# Patient Record
Sex: MALE
[De-identification: names, ages, dates, MRNs, and addresses within clinical notes are randomized per-mention and may not be internally consistent; named-entity substitution may affect disease eponyms.]

---

## 2019-02-08 ENCOUNTER — HOSPITAL ENCOUNTER (EMERGENCY)
Dept: HOSPITAL 94 - ER | Age: 29
Discharge: LEFT BEFORE DISCHARGE | End: 2019-02-08
Payer: MEDICAID

## 2019-02-08 DIAGNOSIS — Z00.8: Primary | ICD-10-CM

## 2019-02-08 DIAGNOSIS — Z53.21: ICD-10-CM

## 2020-05-12 ENCOUNTER — HOSPITAL ENCOUNTER (EMERGENCY)
Dept: HOSPITAL 94 - ER | Age: 30
Discharge: HOME | End: 2020-05-12
Payer: MEDICARE

## 2020-05-12 VITALS — WEIGHT: 174.19 LBS | HEIGHT: 71 IN | BODY MASS INDEX: 24.39 KG/M2

## 2020-05-12 VITALS — DIASTOLIC BLOOD PRESSURE: 90 MMHG | SYSTOLIC BLOOD PRESSURE: 141 MMHG
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
## 2020-05-12 DIAGNOSIS — F32.9: ICD-10-CM

## 2020-05-12 DIAGNOSIS — F12.90: ICD-10-CM
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
## 2020-05-12 DIAGNOSIS — F10.929: ICD-10-CM

## 2020-05-12 DIAGNOSIS — S50.811A: Primary | ICD-10-CM

## 2020-05-12 DIAGNOSIS — R45.851: ICD-10-CM
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
## 2020-05-12 DIAGNOSIS — E87.6: ICD-10-CM
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
## 2020-05-12 LAB
ALBUMIN SERPL BCP-MCNC: 4.4 G/DL (ref 3.4–5)
ALBUMIN/GLOB SERPL: 1.3 {RATIO} (ref 1.1–1.5)
ALP SERPL-CCNC: 72 IU/L (ref 46–116)
ALT SERPL W P-5'-P-CCNC: 26 U/L (ref 12–78)
AMPHETAMINES UR QL SCN: NEGATIVE
ANION GAP SERPL CALCULATED.3IONS-SCNC: 12 MMOL/L (ref 8–16)
AST SERPL W P-5'-P-CCNC: 18 U/L (ref 10–37)
BARBITURATES UR QL SCN: NEGATIVE
BASOPHILS # BLD AUTO: 0 X10'3 (ref 0–0.2)
BASOPHILS NFR BLD AUTO: 0.6 % (ref 0–1)
BENZODIAZ UR QL SCN: NEGATIVE
BILIRUB SERPL-MCNC: 0.8 MG/DL (ref 0.1–1)
BUN SERPL-MCNC: 7 MG/DL (ref 7–18)
BUN/CREAT SERPL: 6.5 (ref 5.4–32)
BZE UR QL SCN: NEGATIVE
CALCIUM SERPL-MCNC: 8.4 MG/DL (ref 8.5–10.1)
CANNABINOIDS UR QL SCN: NEGATIVE
CHLORIDE SERPL-SCNC: 105 MMOL/L (ref 99–107)
CO2 SERPL-SCNC: 27 MMOL/L (ref 24–32)
CREAT SERPL-MCNC: 1.07 MG/DL (ref 0.6–1.1)
EOSINOPHIL # BLD AUTO: 0.1 X10'3 (ref 0–0.9)
EOSINOPHIL NFR BLD AUTO: 1 % (ref 0–6)
ERYTHROCYTE [DISTWIDTH] IN BLOOD BY AUTOMATED COUNT: 13 % (ref 11.5–14.5)
ETHANOL SERPL-MCNC: 0.09 GM/DL (ref 0–0.01)
GFR SERPL CREATININE-BSD FRML MDRD: 82 ML/MIN
GLUCOSE SERPL-MCNC: 123 MG/DL (ref 70–104)
HCT VFR BLD AUTO: 44.6 % (ref 42–52)
HGB BLD-MCNC: 15.6 G/DL (ref 14–17.9)
LYMPHOCYTES # BLD AUTO: 2.6 X10'3 (ref 1.1–4.8)
LYMPHOCYTES NFR BLD AUTO: 35.4 % (ref 21–51)
MCH RBC QN AUTO: 32 PG (ref 27–31)
MCHC RBC AUTO-ENTMCNC: 35 G/DL (ref 33–36.5)
MCV RBC AUTO: 91.3 FL (ref 78–98)
METHADONE UR QL SCN: NEGATIVE
MONOCYTES # BLD AUTO: 0.5 X10'3 (ref 0–0.9)
MONOCYTES NFR BLD AUTO: 6.4 % (ref 2–12)
NEUTROPHILS # BLD AUTO: 4.1 X10'3 (ref 1.8–7.7)
NEUTROPHILS NFR BLD AUTO: 56.6 % (ref 42–75)
OPIATES UR QL SCN: NEGATIVE
PCP UR QL SCN: NEGATIVE
PLATELET # BLD AUTO: 265 X10'3 (ref 140–440)
PMV BLD AUTO: 7.5 FL (ref 7.4–10.4)
POTASSIUM SERPL-SCNC: 3.1 MMOL/L (ref 3.5–5.1)
PROT SERPL-MCNC: 7.7 G/DL (ref 6.4–8.2)
RBC # BLD AUTO: 4.89 X10'6 (ref 4.7–6.1)
SODIUM SERPL-SCNC: 144 MMOL/L (ref 135–145)
WBC # BLD AUTO: 7.2 X10'3 (ref 4.5–11)

## 2020-05-12 PROCEDURE — 80305 DRUG TEST PRSMV DIR OPT OBS: CPT

## 2020-05-12 PROCEDURE — 85025 COMPLETE CBC W/AUTO DIFF WBC: CPT

## 2020-05-12 PROCEDURE — 80320 DRUG SCREEN QUANTALCOHOLS: CPT

## 2020-05-12 PROCEDURE — 36415 COLL VENOUS BLD VENIPUNCTURE: CPT

## 2020-05-12 PROCEDURE — 99284 EMERGENCY DEPT VISIT MOD MDM: CPT

## 2020-05-12 PROCEDURE — 80053 COMPREHEN METABOLIC PANEL: CPT

## 2020-05-12 NOTE — NUR
[place] from [facility] at bedside to assess the patient at this time.

## 2020-07-19 ENCOUNTER — HOSPITAL ENCOUNTER (EMERGENCY)
Dept: HOSPITAL 94 - ER | Age: 30
Discharge: HOME | End: 2020-07-19
Payer: MEDICARE

## 2020-07-19 VITALS — DIASTOLIC BLOOD PRESSURE: 74 MMHG | SYSTOLIC BLOOD PRESSURE: 118 MMHG

## 2020-07-19 VITALS — HEIGHT: 71 IN | BODY MASS INDEX: 19.92 KG/M2 | WEIGHT: 142.31 LBS

## 2020-07-19 DIAGNOSIS — Z72.89: ICD-10-CM

## 2020-07-19 DIAGNOSIS — Z79.899: ICD-10-CM

## 2020-07-19 DIAGNOSIS — K59.00: Primary | ICD-10-CM

## 2020-07-19 DIAGNOSIS — F12.90: ICD-10-CM

## 2020-07-19 DIAGNOSIS — E86.0: ICD-10-CM

## 2020-07-19 LAB
ALBUMIN SERPL BCP-MCNC: 4.9 G/DL (ref 3.4–5)
ALBUMIN/GLOB SERPL: 1.3 {RATIO} (ref 1.1–1.5)
ALP SERPL-CCNC: 47 IU/L (ref 46–116)
ALT SERPL W P-5'-P-CCNC: 22 U/L (ref 12–78)
AMPHETAMINES UR QL SCN: NEGATIVE
ANION GAP SERPL CALCULATED.3IONS-SCNC: 17 MMOL/L (ref 8–16)
AST SERPL W P-5'-P-CCNC: 24 U/L (ref 10–37)
BACTERIA URNS QL MICRO: (no result) /HPF
BARBITURATES UR QL SCN: NEGATIVE
BASOPHILS # BLD AUTO: 0 X10'3 (ref 0–0.2)
BASOPHILS NFR BLD AUTO: 0.6 % (ref 0–1)
BENZODIAZ UR QL SCN: NEGATIVE
BILIRUB SERPL-MCNC: 1.2 MG/DL (ref 0.1–1)
BUN SERPL-MCNC: 30 MG/DL (ref 7–18)
BUN/CREAT SERPL: 22.4 (ref 5.4–32)
BZE UR QL SCN: NEGATIVE
CALCIUM SERPL-MCNC: 10.1 MG/DL (ref 8.5–10.1)
CANNABINOIDS UR QL SCN: NEGATIVE
CHLORIDE SERPL-SCNC: 110 MMOL/L (ref 99–107)
CLARITY UR: CLEAR
CO2 SERPL-SCNC: 23.5 MMOL/L (ref 24–32)
COLOR UR: YELLOW
CREAT SERPL-MCNC: 1.34 MG/DL (ref 0.6–1.1)
DEPRECATED SQUAMOUS URNS QL MICRO: (no result) /LPF
EOSINOPHIL # BLD AUTO: 0 X10'3 (ref 0–0.9)
EOSINOPHIL NFR BLD AUTO: 0.2 % (ref 0–6)
ERYTHROCYTE [DISTWIDTH] IN BLOOD BY AUTOMATED COUNT: 12.9 % (ref 11.5–14.5)
GFR SERPL CREATININE-BSD FRML MDRD: 63 ML/MIN
GLUCOSE SERPL-MCNC: 90 MG/DL (ref 70–104)
GLUCOSE UR STRIP-MCNC: NEGATIVE MG/DL
HCT VFR BLD AUTO: 47 % (ref 42–52)
HGB BLD-MCNC: 15.9 G/DL (ref 14–17.9)
HGB UR QL STRIP: NEGATIVE
KETONES UR STRIP-MCNC: >=80 MG/DL
LEUKOCYTE ESTERASE UR QL STRIP: NEGATIVE
LIPASE SERPL-CCNC: 120 U/L (ref 73–393)
LYMPHOCYTES # BLD AUTO: 1.3 X10'3 (ref 1.1–4.8)
LYMPHOCYTES NFR BLD AUTO: 19.7 % (ref 21–51)
MCH RBC QN AUTO: 31.6 PG (ref 27–31)
MCHC RBC AUTO-ENTMCNC: 33.9 G/DL (ref 33–36.5)
MCV RBC AUTO: 93.1 FL (ref 78–98)
METHADONE UR QL SCN: NEGATIVE
MONOCYTES # BLD AUTO: 0.4 X10'3 (ref 0–0.9)
MONOCYTES NFR BLD AUTO: 6.8 % (ref 2–12)
MUCOUS THREADS URNS QL MICRO: (no result) /LPF
NEUTROPHILS # BLD AUTO: 4.7 X10'3 (ref 1.8–7.7)
NEUTROPHILS NFR BLD AUTO: 72.7 % (ref 42–75)
NITRITE UR QL STRIP: NEGATIVE
OPIATES UR QL SCN: NEGATIVE
PCP UR QL SCN: NEGATIVE
PH UR STRIP: 5.5 [PH] (ref 4.8–8)
PLATELET # BLD AUTO: 258 X10'3 (ref 140–440)
PMV BLD AUTO: 9 FL (ref 7.4–10.4)
POTASSIUM SERPL-SCNC: 4 MMOL/L (ref 3.5–5.1)
PROT SERPL-MCNC: 8.6 G/DL (ref 6.4–8.2)
PROT UR QL STRIP: 30 MG/DL
RBC # BLD AUTO: 5.04 X10'6 (ref 4.7–6.1)
RBC #/AREA URNS HPF: (no result) /HPF (ref 0–2)
SODIUM SERPL-SCNC: 150 MMOL/L (ref 135–145)
SP GR UR STRIP: >=1.03 (ref 1–1.03)
URN COLLECT METHOD CLASS: (no result)
UROBILINOGEN UR STRIP-MCNC: 1 E.U/DL (ref 0.2–1)
WBC # BLD AUTO: 6.5 X10'3 (ref 4.5–11)
WBC #/AREA URNS HPF: (no result) /HPF (ref 0–4)

## 2020-07-19 PROCEDURE — 99284 EMERGENCY DEPT VISIT MOD MDM: CPT

## 2020-07-19 PROCEDURE — 80305 DRUG TEST PRSMV DIR OPT OBS: CPT

## 2020-07-19 PROCEDURE — 83690 ASSAY OF LIPASE: CPT

## 2020-07-19 PROCEDURE — 80053 COMPREHEN METABOLIC PANEL: CPT

## 2020-07-19 PROCEDURE — 36415 COLL VENOUS BLD VENIPUNCTURE: CPT

## 2020-07-19 PROCEDURE — 85025 COMPLETE CBC W/AUTO DIFF WBC: CPT

## 2020-07-19 PROCEDURE — 81001 URINALYSIS AUTO W/SCOPE: CPT

## 2020-07-19 PROCEDURE — 96360 HYDRATION IV INFUSION INIT: CPT

## 2020-07-19 PROCEDURE — 74018 RADEX ABDOMEN 1 VIEW: CPT

## 2022-12-03 NOTE — NUR
Admission Note: Pt. was admitted from [facility] in a w/c, safety check was completed and 
belongings were inventoried by Pam Castaneda. Pt. is on a 5150 for DTS. Per 5150: Pt. made 
suicidal statements, and reported placing a knife to his neck and starting to stab himself 
in a suicide attempt. Pt. has a history of 3-5 past suicide attempts, the most recent was 
one month ago. 



Pt. scored as a high risk on the [employer] Suicide Risk assessment, however he is able to 
contract for safety while on the unit. This was endorsed to SAVANNAH Schultz and Q 15min safety 
checks were ordered.

## 2022-12-04 NOTE — NUR
Nursing Progress Note:



Problem: Pt. was admitted from [facility] Pt. is on a 5150 for DTS. Per 5150: Pt. made suicidal 
statements, and reported placing a knife to his neck and starting to stab himself in a 
suicide attempt. Pt. has a history of 3-5 past suicide attempts, the most recent was one 
month ago. Per ER notes pt has Differential Dx of psychosis, depression, suicidal ideation. 



Interventions: Maintained a safe and supportive environment, administered medication per 
orders with no adverse side effects, provided clear and simple instructions, provided 
redirection as needed, and maintained Q 15min safety checks.



Response:  Pt was resting in bed at start of shift. 1:1 assessment and interview at bedside, 
pt unable to maintain eye contact. Pt states he is having SI because he is depressed about 
life. Pt also states he has AH/VH. When asked of content pt states I dont remember what 
they say. Pt states he sees images of people. Pt did not eat dinner and refused snack. Pt 
states he is not hungry. Pt isolates in room. Pt slept through the night.



Plan: Crisis interruption, stabilization with medication adjustment, management and 
monitoring in a safe and therapeutic environment until stable.

## 2022-12-04 NOTE — NUR
Nursing Progress Note:



Problem: Pt. was admitted from [facility] Pt. is on a 5150 for DTS. Per 5150: Pt. made suicidal 
statements, and reported placing a knife to his neck and starting to stab himself in a 
suicide attempt. Pt. has a history of 3-5 past suicide attempts, the most recent was one 
month ago. Patient was discharged from [facility] on 11/29/22.  Per ER notes pt has Differential Dx 
of psychosis, depression, suicidal ideation. 



Interventions: Provide medication administration & medication management; Maintained a safe 
& supportive environment; Clear & simple instructions; Direction & encouragement  regarding 
performance of ADLs; monitored behaviors & maintained clear boundaries; Patient physical 
assessment & 1:1 patient interview; Therapeutic conversation & active listening; Patient 
education & monitoring.



Response:  Received patient while he was asleep in his bed and woke up at approximately 
0740.  Patient appears to have a flat affect.  Speaks only to questions asked by this 
Writer.  Patient went to the [facility] for breakfast, but only ate a minimal amount of 
breakfast.  Patient returned to his room for a patient interview.  Patient reports Im very 
suicidal and I will kill myself with the knife that I have.  Informed the patient that we 
will try to help him with his medication regime and also attending group sessions starting 
tomorrow.  Patient gave this Writer a small grin and informed Okay.  Patient slept all 
morning and got up for lunch then returned back to bed. Patient self-isolated all day.   



Plan: Crisis interruption, stabilization with medication adjustment, management and 
monitoring in a safe and therapeutic environment until stable.

## 2022-12-04 NOTE — NUR
Noted pt low BMI 18.0 in EMR. Pt admit from [facility] DX SI, 5150, depression, 

and having AH/VH per EMR. Pt was malnourished on recent prior 11/8/22 

admit though by discharge 11/28 was eating adequately from meals and 

Ensure Enlives TID. Current standing scaled wt 55.2kg down from 57.7 11/28 

though pt w/ normal strength, no edema/wounds, and prior adequate intake 

hx. Given recent adequate intake hx pt lacks malnutrition criteria at this 

time though is high risk if PO regresses this admit. Will monitor for PO 

trends and malnutrition criteria this admit.

-------------------------------------------------------------------------------

Addendum: 12/04/22 at 0749 by Eric Sanchez RD

-------------------------------------------------------------------------------

Amended: Links added.

## 2022-12-05 NOTE — NUR
Nursing Progress Note:



Problem: Pt. was admitted from [facility] Pt. is on a 5150 for DTS. Per 5150: Pt. made suicidal 
statements, and reported placing a knife to his neck and starting to stab himself in a 
suicide attempt. Pt. has a history of 3-5 past suicide attempts, the most recent was one 
month ago. Patient was discharged from [facility] on 11/29/22.  Per ER notes pt has Differential Dx 
of psychosis, depression, suicidal ideation. 



Interventions: Provide medication administration & medication management; Maintained a safe 
& supportive environment; Clear & simple instructions; Direction & encouragement  regarding 
performance of ADLs; monitored behaviors & maintained clear boundaries; Patient physical 
assessment & 1:1 patient interview; Therapeutic conversation & active listening; Patient 
education & monitoring.



Response: Received patient when he woke up from his night sleep at 0720.  During the patient 
interview, the patient denied anxiety, and reported When I get out of here I am going to 
sharpen my knife first then use it on my neck.  Patient was observed staring eyes wide open 
multiple times while sitting alongside his bed throughout the day, and he appeared restless, 
although denying any s/s of anxiety throughout the morning.  At approximately 1230 patient 
was observed ambulating throughout the halls, staring blankly straight ahead without making 
any eye contact with others, and refused his lunch.  TC placed to SAVANNAH Queen, to inform of 
patients anxiety and need for a prn medication to treat his anxiety.  Dr. Chang was readily 
available and ordered Ativan 0.5mg po qid prn.  Patient received his first dose at 1350 and 
laid down to rest at this time.  





Plan: Crisis interruption, stabilization with medication adjustment, management and 
monitoring in a safe and therapeutic environment until stable. Patient has been referred to 
[facility].

## 2022-12-05 NOTE — NUR
Nursing Progress Note:



Problem: Pt. was admitted from [facility] Pt. is on a 5150 for DTS. Per 5150: Pt. made suicidal 
statements, and reported placing a knife to his neck and starting to stab himself in a 
suicide attempt. Pt. has a history of 3-5 past suicide attempts, the most recent was one 
month ago. Patient was discharged from [facility] on 11/29/22.  Per ER notes pt has Differential Dx 
of psychosis, depression, suicidal ideation. 



Interventions: Provide medication administration & medication management; Maintained a safe 
& supportive environment; Clear & simple instructions; Direction & encouragement  regarding 
performance of ADLs; monitored behaviors & maintained clear boundaries; Patient physical 
assessment & 1:1 patient interview; Therapeutic conversation & active listening; Patient 
education & monitoring.



Response:  Pt isolated to room all shift.  



Declined snack.  No HS routine medications.  Declined PRN trazodone.  



Pt has a flat affect minimal answers to questions  in a very soft monotone.  Pt says he has 
suicidal thoughts but would not do anything in here. 



 He says he hears voices but says he does not remember what they say.  Declined snack 
sleeping at this time. 



Plan: Crisis interruption, stabilization with medication adjustment, management and 
monitoring in a safe and therapeutic environment until stable.

## 2022-12-06 NOTE — NUR
Nursing Progress Note: Mayco CAI



Problem: 

Pt. was admitted from [facility] Pt. is on a 5150 for DTS. Per 5150: Pt. made suicidal statements, 
and reported placing a knife to his neck and starting to stab himself in a suicide attempt. 
Pt. has a history of 3-5 past suicide attempts, the most recent was one month ago. Patient 
was discharged from [facility] on 11/29/22.  Dx of psychosis, depression, suicidal ideation. 



Interventions: 

Medication administration, 1:1 MH assessment, maintained a safe and supportive environment,  
provided clear and simple instructions, provided encouragement regarding performance of 
ADLs, monitored behaviors and maintained clear boundaries, maintained Q15 minute safety 
checks.

Response: 

Received pt. sleeping in his room, he woke for medications which he took without hesitation. 
Pt. endorses SI, stating Ill cut myself he denies HI, endorses AH I hear people, but I 
cant hear everything they say he denies VH. Pt. isolated in his room all shift. He briefly 
attended meals but left without eating breakfast and lunch. Pt. was willing to drink an 
Ensure at the bedside with this writer and also had a juice. He has poor eye contact, and 
speaks in a whispered voice, and presents as down casted. Pt. is disheveled, poor hygiene, 
and wears unit scrubs. 



Plan: 

Crisis interruption, stabilization with medication adjustment, management and monitoring in 
a safe and therapeutic environment until stable. Patient has been referred to [facility].

## 2022-12-06 NOTE — NUR
Nursing Progress Note:



Problem: Pt. was admitted from [facility] Pt. is on a 5150 for DTS. Per 5150: Pt. made suicidal 
statements, and reported placing a knife to his neck and starting to stab himself in a 
suicide attempt. Pt. has a history of 3-5 past suicide attempts, the most recent was one 
month ago. Patient was discharged from [facility] on 11/29/22.  Per ER notes pt has Differential Dx 
of psychosis, depression, suicidal ideation. 



Interventions: Provide medication administration & medication management; Maintained a safe 
& supportive environment; Clear & simple instructions; Direction & encouragement  regarding 
performance of ADLs; monitored behaviors & maintained clear boundaries; Patient physical 
assessment & 1:1 patient interview; Therapeutic conversation & active listening; Patient 
education & monitoring.



Response: 

1:1 at bedside. Pt laying in bed. Appears withdrawn and depressed. Denies SI and hearing 
voices at this time. He tells me his depression started at age 3yo. Refused snack. "Not 
hungry". Encouraged him to try and eat. 



His lips appeared dry and chapped. Lip balm provided and pt reported it helping. Compliant 
with all meds.



No HS meds scheduled.



 Pt isolated to room all shift.  



Plan: Crisis interruption, stabilization with medication adjustment, management and 
monitoring in a safe and therapeutic environment until stable.

## 2022-12-06 NOTE — NUR
Nursing Progress Note:



Problem: Pt. was admitted from [facility] Pt. is on a 5150 for DTS. Per 5150: Pt. made suicidal 
statements, and reported placing a knife to his neck and starting to stab himself in a 
suicide attempt. Pt. has a history of 3-5 past suicide attempts, the most recent was one 
month ago. Patient was discharged from [facility] on 11/29/22.  Per ER notes pt has Differential Dx 
of psychosis, depression, suicidal ideation. 



Interventions: Provide medication administration & medication management; Maintained a safe 
& supportive environment; Clear & simple instructions; Direction & encouragement  regarding 
performance of ADLs; monitored behaviors & maintained clear boundaries; Patient physical 
assessment & 1:1 patient interview; Therapeutic conversation & active listening; Patient 
education & monitoring.



Response: 

1:1 at bedside. Pt laying in bed. Appears withdrawn and depressed. Denies SI and hearing 
voices at this time. He tells me his depression started at age 3yo. Refused snack. "Not 
hungry". Encouraged him to try and eat. 



His lips appeared dry and chapped. Lip balm provided and pt reported it helping. Compliant 
with all meds.



No HS meds scheduled.













 

 Pt isolated to room all shift.  



Declined snack.  No HS routine medications.  Declined PRN trazodone.  



Pt has a flat affect minimal answers to questions  in a very soft monotone.  Pt says he has 
suicidal thoughts but would not do anything in here. 



 He says he hears voices but says he does not remember what they say.  Declined snack 
sleeping at this time. 



Plan: Crisis interruption, stabilization with medication adjustment, management and 
monitoring in a safe and therapeutic environment until stable.


-------------------------------------------------------------------------------

Addendum: 12/06/22 at 0131 by Sade Hernández RN

-------------------------------------------------------------------------------

Disregard this note see following note.

## 2022-12-07 NOTE — NUR
Nursing Progress Note: 



Problem: 

Pt. was admitted from [facility] Pt. is on a 5150 for DTS. Per 5150: Pt. made suicidal statements, 
and reported placing a knife to his neck and starting to stab himself in a suicide attempt. 
Pt. has a history of 3-5 past suicide attempts, the most recent was one month ago. Patient 
was discharged from [facility] on 11/29/22.  Dx of psychosis, depression, suicidal ideation. 



Interventions: 

Medication administration, 1:1 MH assessment, maintained a safe and supportive environment,  
provided clear and simple instructions, provided encouragement regarding performance of 
ADLs, monitored behaviors and maintained clear boundaries, maintained Q15 minute safety 
checks.



Response: 

Patient is pleasant but guarded; no scheduled medications this shift and denied needing any 
PRN medication. Patient denied SI, HI, A/VH; patient responded as minimal as possible. He 
remained in bed throughout this shift; does not appear to be having difficulty sleeping. 



Plan: 

Crisis interruption, stabilization with medication adjustment, management and monitoring in 
a safe and therapeutic environment until stable. Patient has been referred to [facility].

## 2022-12-07 NOTE — NUR
Nursing Progress Note: Mayco CAI



Problem: 

Pt. was admitted from [facility] Pt. is on a 5150 for DTS. Per 5150: Pt. made suicidal statements, 
and reported placing a knife to his neck and starting to stab himself in a suicide attempt. 
Pt. has a history of 3-5 past suicide attempts, the most recent was one month ago. Patient 
was discharged from [facility] on 11/29/22.  Dx of psychosis, depression, suicidal ideation. 



Interventions: 

Medication administration, 1:1  assessment, maintained a safe and supportive environment,  
provided clear and simple instructions, provided encouragement regarding performance of 
ADLs, monitored behaviors and maintained clear boundaries, maintained Q15 minute safety 
checks.

Response: 

Received pt. sleeping in his room and awoke to take his medications. Pt. endorses SI, and 
reports he would use a knife he denies HI, AH, VH. Pt. required several prompts to eat his 
meals but often refused to go in dining room, or eat if the tray was brought in. Pt. was 
encouraged to increase bedside fluids, but is not compliant. Pt. presents as disheveled, 
poor hygiene, and wearing the same street clothes from yesterday. Pt. did take a shower 
after several request but did not attend to oral care. Pt. reported normal BM, but later 
reported LBM was 5 days ago. The writer reproached pt. and discussed BM Hx and he now 
reports 3 days since LBM, he presents as a poor historian, but c/o feeling full Bowel 
sounds sluggish; PRN MOM given. Pt. presents withdrawn, minimal communication, and often 
mumbles in a small voice. Pt. approached staff this afternoon c/o anxiety and was given 
PRN Ativan. He remained in his room and isolated from cohorts. 

Pt. refused all meals this shift.

PRNs : Ativan

Plan: 

Crisis interruption, stabilization with medication adjustment, management and monitoring in 
a safe and therapeutic environment until stable. Patient has been referred to [facility].

## 2022-12-07 NOTE — NUR
Initial: Pt admitted w/ major depressive disorder and SI per EMR. 

Currently on Regular diet mostly refusing meals. Per documentation pt 

mostly stays in room all day and says he is not hungry. Per nursing notes 

pt was willing to drink Ensure at bedside though ONS not currently 

ordered. Recommend adding an Ensure Enlive TID if pt is willing to drink 

them. LBM 12/5. Will continue to monitor.

Recs:

1. Continue Regular diet; encourage PO

2. Ensure Enlive TID

3. Bowel care PRN

4. Weekly wts

-------------------------------------------------------------------------------

Addendum: 12/07/22 at 0710 by Ty Chaparro RD

-------------------------------------------------------------------------------

Amended: Links added.

## 2022-12-08 NOTE — NUR
Nursing Progress Note: Mayco CAI



Problem: 

Pt. was admitted from [facility] Pt. is on a 5150 for DTS. Per 5150: Pt. made suicidal statements, 
and reported placing a knife to his neck and starting to stab himself in a suicide attempt. 
Pt. has a history of 3-5 past suicide attempts, the most recent was one month ago. Patient 
was discharged from [facility] on 11/29/22.  Dx of psychosis, depression, suicidal ideation. 



Interventions: 

Medication administration, 1:1 MH assessment, maintained a safe and supportive environment,  
provided clear and simple instructions, provided encouragement regarding performance of 
ADLs, monitored behaviors and maintained clear boundaries, maintained Q15 minute safety 
checks.

Response: 

Received pt. sleeping in his room and awoke to take his medications. Pt. endorses SI without 
a plan, and denies HI, AH, VH and has no DC plan. Pt. often speaks so quietly he is 
inaudible. Pt. presents in a weakened and frail state. He refused all meals this shift and 
for the past 3 days, but will drink his Ensures when prompted and delivered, as he doesnt 
attend meal times. Pt. has been encouraged to increase fluids and leave his room to attend 
meals and groups. Pt. is down casted, disheveled, and wears unit scrubs. 

Pt. refused all meals this shift.

PRNs : None

Plan: 

Crisis interruption, stabilization with medication adjustment, management and monitoring in 
a safe and therapeutic environment until stable. Patient has been referred to [facility].

## 2022-12-08 NOTE — NUR
Nursing Progress Note: 



Problem: 

Pt. was admitted from [facility] Pt. is on a 5150 for DTS. Per 5150: Pt. made suicidal statements, 
and reported placing a knife to his neck and starting to stab himself in a suicide attempt. 
Pt. has a history of 3-5 past suicide attempts, the most recent was one month ago. Patient 
was discharged from [facility] on 11/29/22.  Dx of psychosis, depression, suicidal ideation. 



Interventions: 

Medication administration, 1:1 MH assessment, maintained a safe and supportive environment,  
provided clear and simple instructions, provided encouragement regarding performance of 
ADLs, monitored behaviors and maintained clear boundaries, maintained Q15 minute safety 
checks.



Response: 

Patient is pleasant; remains isolative and guarded. Talked to writer a little more this 
shift than previous night. Appeared to be anxious and PRN Ativan provided. Patient appears 
severely depressed and stays in bed throughout the entire shift. He endorses SI without a 
plan; denies HI, A/VH. Patient encouraged to drink more fluids. He is observed sleeping and 
does not appear to be having difficulty. 



D/t Hx of HIV, Dr. Connor ordered lab draw for CD4 count and viral load. 



Plan: 

Crisis interruption, stabilization with medication adjustment, management and monitoring in 
a safe and therapeutic environment until stable. Patient has been referred to [facility].

## 2022-12-09 NOTE — NUR
Nursing Progress Note: 



Problem: 

Pt. was admitted from [facility] Pt. is on a 5150 for DTS. Per 5150: Pt. made suicidal statements, 
and reported placing a knife to his neck and starting to stab himself in a suicide attempt. 
Pt. has a history of 3-5 past suicide attempts, the most recent was one month ago. Patient 
was discharged from [facility] on 11/29/22.  Dx of psychosis, depression, suicidal ideation. 



Interventions: 1:1 assessment, therapeutic communication, encouraged pt to express his 
thoughts and feelings, active listening, ensured contract for safety, medication 
administration/education/monitoring, encouraged hydration/nutrition, encouraged pt to come 
to meals, monitored meal intake, provided direction, positive reinforcemetn, and  maintained 
Q15 minute safety checks.





Response: Pt initially refused to come to breakfast. Tray was brought to his room. Pt 
brought it back untouched stating that he wasn't hungry. Pt was cooperative with 
medications. A short while later, pt came out of his room and asked for his tray. Pt ate 
breakfast in the dining room. Pt ate 100% of his protein, 50% of his carbs, apple juiced, 
and drank 100% of his Ensure. At lunch, pt ate 25% of his carbs, 50% of his protein, and 
drank 100% of his Ensure. Pt reports not having a BM X 4 days, pt has not eaten much over 
the past 4 days. Pt refused prune juice or MOM, pt denies sx of constipation. Pt has a new 
order for Colace 100 mg PO BID. Pt has a flat affect. Pt rated his depression at a 6/10. Pt 
endorses passive SI. Pt denied AH/VH. Pt had labs drawn. Pt's lymph % was low at 12.1, his 
lymph # was low at 0.7, neut % elevated at 84, his absolute CD4 count was 357. His HIV lab 
result is pending.



Plan: Crisis interruption, stabilization with medication adjustment, management and 
monitoring in a safe and therapeutic environment until stable. Patient has been referred to 
[facility].

## 2022-12-09 NOTE — NUR
Nursing Progress Note: Mayco CAI



Problem: 

Pt. was admitted from [facility] Pt. is on a 5150 for DTS. Per 5150: Pt. made suicidal statements, 
and reported placing a knife to his neck and starting to stab himself in a suicide attempt. 
Pt. has a history of 3-5 past suicide attempts, the most recent was one month ago. Patient 
was discharged from [facility] on 11/29/22.  Dx of psychosis, depression, suicidal ideation. 



Interventions: 

Medication administration, 1:1 MH assessment, maintained a safe and supportive environment,  
provided clear and simple instructions, provided encouragement regarding performance of 
ADLs, monitored behaviors and maintained clear boundaries, maintained Q15 minute safety 
checks.





Response: Patient was found sleeping at beginning of shift. Patient continued to sleep 
through out shift. Patient refused to participate in snack and ignored nurse when trying to 
offer him night medication. 





Plan: 

Crisis interruption, stabilization with medication adjustment, management and monitoring in 
a safe and therapeutic environment until stable. Patient has been referred to [facility].

## 2022-12-09 NOTE — NUR
F/u: Pt refusing all meals w/ ~50% avg Ensure Plus High Protein TIDWM 

substitute for Ensure Enlive while out of stock; not meeting needs. LBM 

12/5 constipated per EMR; VINCE d/w RN regarding routine bowel regimen if MD 

agreeable. Pt both reports he both does and doesn't eat because of 

tapeworm per PA note. Pt not participating in mealtimes per RN notes. At 

this time lacks minimum two malnutrition criteria though is at high risk 

w/ current intake trends; will monitor further wt trends. Will monitor for 

further PO acceptance and nutrition intervention needs.

Recs:

1. Continue Regular diet; encourage PO

2. Ensure Enlive TID; substitute Ensure Plus High Protein TID while out of 

Ensure Enlives

3. Bowel care PRN

4. Weekly wts

-------------------------------------------------------------------------------

Addendum: 12/09/22 at 0916 by Eric Sanchez RD

-------------------------------------------------------------------------------

Amended: Links added.

## 2022-12-10 NOTE — NUR
Nursing Progress Note: 



Problem: 

Pt. was admitted from [facility] Pt. is on a 5150 for DTS. Per 5150: Pt. made suicidal statements, 
and reported placing a knife to his neck and starting to stab himself in a suicide attempt. 
Pt. has a history of 3-5 past suicide attempts, the most recent was one month ago. Patient 
was discharged from [facility] on 11/29/22.  Dx of psychosis, depression, suicidal ideation. 



Interventions: 1:1 assessment, therapeutic communication, encouraged pt to express his 
thoughts and feelings, active listening, ensured contract for safety, medication 
administration/education/monitoring, encouraged hydration/nutrition, encouraged pt to come 
to meals, monitored meal intake, provided direction, positive reinforcemetn, and  maintained 
Q15 minute safety checks.





Response: Patient was received sleeping at change of shift. Patient continued to sleep 
through out shift. Patient was awake and agrred to take night medications. Patient retuned 
to sleep after meds. Patient refused to participate in snack and participate with others. 





Plan: Crisis interruption, stabilization with medication adjustment, management and 
monitoring in a safe and therapeutic environment until stable. Patient has been referred to 
[facility].

## 2022-12-10 NOTE — NUR
Nursing Progress Note:



Problem : Per 5150 Pt. reports recurring suicidal thoughts and was planning to commit 
suicide with a hand written note. Pt. makes multiple somatic complaints. Pt. reports of 
having a tape worm and recently losing 70lbs. Pt. reports he has a bug in his right ear, 
that this bone structure is changing, and that he has HIV. Pt. reports SI but states, "Not 
here though". Pt. reports he did have a plan to use a knife to cut himself. Pt. report 
previous suicide attempt attempting to cut himself 2 years ago. Pt. reports A/VH, reporting 
that he hears people talking in his head and images being projected and bright flashes. Pt. 
reports he does not have any goals or dreams because 6 years ago he quit working due to 
memory issues. Pt. stopped taking his medications in May 2022 because he felt they were not 
working.



Interventions : Introduced self and established rapport, maintained a safe and supportive 
environment, ensured contract for safety, provided clear and simple instructions, provided 
active listening and positive encouragement, encouraged participation on the unit, provided 
medication education and encouraged compliance, and maintained Q 15min safety checks.  



Response :  Patient slept until breakfast, but didnt want to get up and eat. He was 
reminded that hes in the hospital for a reason, and cant just lay around and not eat. He 
got up and went to dining room, but eats very little. Patient then went back to his room and 
isolated until lunch. Same process again, and he only eats a little bit. His affect remains 
flat and blunted. Patient is guarded and doesnt look at me. The only thing he will discuss 
is suicide, and how he has nothing to live for, but not the reasoning behind it.



Plan : Pt. is not eating nor drinking and needs crisis intervention and medication 
titration. Pt. wants to go to the [facility] and working with  for that.

## 2022-12-11 NOTE — NUR
Nursing Progress Note:



Problem: Pt. was admitted from [facility] Pt. is on a 5150 for DTS. Per 5150: Pt. made suicidal 
statements, and reported placing a knife to his neck and starting to stab himself in a 
suicide attempt. Pt. has a history of 3-5 past suicide attempts, the most recent was one 
month ago. Patient was discharged from [facility] on 11/29/22.  Per ER notes pt has Differential Dx 
of psychosis, depression, suicidal ideation. 



Interventions: Provide medication administration & medication management; Maintained a safe 
& supportive environment; Clear & simple instructions; Direction & encouragement  regarding 
performance of ADLs; monitored behaviors & maintained clear boundaries; Patient physical 
assessment & 1:1 patient interview; Therapeutic conversation & active listening; Patient 
education & monitoring.



Response: Received patient when he was woke up at 0750 to administer his 0800 medications.  
Patient reports during the patient interview Yes, I am feeling suicidal and I have a plan. 
 Patient denied AH/VH at this time.  Patient reports I dont know what Im going to do when 
I leave here, but I do have a knife to get sharpened.  Patient also reports that he has had 
no bowel movement for 6 days.  Offered the patient MOM or prune juice and he declined and 
said Ill wait, Im not eating very much.  Informed the patient that I do not want him to 
get a bowel obstruction and he stated I usually dont go very often, especially when Im 
not eating.  Patient self-isolated all morning, and declined offer for medication for 
anxiety.  Patient declined to go to the Community Room for lunch and stayed in his room 
self-isolating the rest of the day.  Patient was encouraged to come out of his room and join 
the others for football or crafts and patient declined. Also offered patient MOM or prune 
juice again at 1545 and patient declined the offer.  Will continue to observe and monitor 
the patient closely at all times.



Plan: Crisis interruption, stabilization with medication adjustment, management and 
monitoring in a safe and therapeutic environment until stable. Patient has been referred to 
[facility]

## 2022-12-11 NOTE — NUR
Nursing Progress Note:



Problem : Per 5150 Pt. reports recurring suicidal thoughts and was planning to commit 
suicide with a hand written note. Pt. makes multiple somatic complaints. Pt. reports of 
having a tape worm and recently losing 70lbs. Pt. reports he has a bug in his right ear, 
that this bone structure is changing, and that he has HIV. Pt. reports SI but states, "Not 
here though". Pt. reports he did have a plan to use a knife to cut himself. Pt. report 
previous suicide attempt attempting to cut himself 2 years ago. Pt. reports A/VH, reporting 
that he hears people talking in his head and images being projected and bright flashes. Pt. 
reports he does not have any goals or dreams because 6 years ago he quit working due to 
memory issues. Pt. stopped taking his medications in May 2022 because he felt they were not 
working.



Interventions : Introduced self and established rapport, maintained a safe and supportive 
environment, ensured contract for safety, provided clear and simple instructions, provided 
active listening and positive encouragement, encouraged participation on the unit, provided 
medication education and encouraged compliance, and maintained Q 15min safety checks.  



Response : Patient was recieved sleeping in bed. Patient continued to sleep and self isolate 
through out shift. Patient awakened to take night medications and went back to bed. 





Plan : Pt. is not eating nor drinking and needs crisis intervention and medication 
titration. Pt. wants to go to the [facility] and working with SS for that.

## 2022-12-12 NOTE — NUR
Nursing Progress Note:



Problem: Pt. was admitted from [facility] Pt. is on a 5150 for DTS. Per 5150: Pt. made suicidal 
statements, and reported placing a knife to his neck and starting to stab himself in a 
suicide attempt. Pt. has a history of 3-5 past suicide attempts, the most recent was one 
month ago. Patient was discharged from [facility] on 11/29/22.  Per ER notes pt has Differential Dx 
of psychosis, depression, suicidal ideation. 



Interventions: Provide medication administration & medication management; Maintained a safe 
& supportive environment; Clear & simple instructions; Direction & encouragement  regarding 
performance of ADLs; monitored behaviors & maintained clear boundaries; Patient physical 
assessment & 1:1 patient interview; Therapeutic conversation & active listening; Patient 
education & monitoring.



Response: 



Pt lying in bed in a dark room at start of shift.  He says he still wants to commit suicide. 
When he gets home he will use a knife and cut his throat.  He says he has not heard any 
voices today.  



At snack time pt was told there is a new rule and he must get up for snack.  He got up went 
to the group room willingly without any questions.   He returned to room immediately after 
eating snack.  Took HS medications and went to sleep. 



Plan: Crisis interruption, stabilization with medication adjustment, management and 
monitoring in a safe and therapeutic environment until stable. [facility]

## 2022-12-12 NOTE — NUR
Nursing Progress Note:



Problem: Pt. was admitted from [facility] Pt. is on a 5150 for DTS. Per 5150: Pt. made suicidal 
statements, and reported placing a knife to his neck and starting to stab himself in a 
suicide attempt. Pt. has a history of 3-5 past suicide attempts, the most recent was one 
month ago. Patient was discharged from [facility] on 11/29/22.  Per ER notes patient has 
Differential Diagnosis: Psychosis, Depression, Suicidal Ideation. 



Interventions: Provide medication administration & medication management; Maintained a safe 
& supportive environment; Clear & simple instructions; Direction & encouragement  regarding 
performance of ADLs; monitored behaviors & maintained clear boundaries; Patient physical 
assessment & 1:1 patient interview; Therapeutic conversation & active listening; Patient 
education & monitoring.



Response: Patient slept until approximately 0745 when he was awakened and administered his 
0800 medications.  Patient took his medication without hesitation, and then was coaxed out 
of bed to come to the Community Room for breakfast.  After 3 times that Crystal, PCT asked 
the patient to come to the Community Room for breakfast, patient got dressed and joined this 
Writer in the Community Room.  Patient denied anxiety at this time but did inform Im just 
never hungry.  Encouraged the patient to start getting up out of bed and to make an attempt 
to eat each meal, even if it is smaller amounts each time.  Patient agreed he would make an 
effort to eat each meal.  Patient reports I feel like Im going to cut myself right in the 
neck and kill myself.  I dont feel like I have any reason to live.  Informed the patient 
that each one of us, including his mother, really care about him and would never want to see 
him harm himself.  I informed the patient that he is really important and he will find his 
place in this world and gain some experience including life experience, and challenged the 
patient to start thinking about his interests and what he might want to start thinking about 
as far as a job trade.  Patient smiled briefly and continued to stare straight forward 
during the conversation with an occasional okay heard from him.  Patient self-isolated in 
his room all day with the exception of mealtime, but did eat almost all of his meals at 
breakfast and lunch time. Will continue to monitor the patient closely.



Plan: Crisis interruption, stabilization with medication adjustment, management and 
monitoring in a safe and therapeutic environment until stable. Patient has been referred to 
[facility].

## 2022-12-13 NOTE — NUR
Nursing Progress Note:



Problem: Pt. was admitted from [facility] Pt. is on a 5150 for DTS. Per 5150: Pt. made suicidal 
statements, and reported placing a knife to his neck and starting to stab himself in a 
suicide attempt. Pt. has a history of 3-5 past suicide attempts, the most recent was one 
month ago. Patient was discharged from [facility] on 11/29/22.  Per ER notes patient has 
Differential Diagnosis: Psychosis, Depression, Suicidal Ideation. 



Interventions: Provide medication administration & medication management; Maintained a safe 
& supportive environment; Clear & simple instructions; Direction & encouragement  regarding 
performance of ADLs; monitored behaviors & maintained clear boundaries; Patient physical 
assessment & 1:1 patient interview; Therapeutic conversation & active listening; Patient 
education & monitoring.



Response: Patient was lying in bed quietly in the dark at change of shift. Met with RN for 
1:1 assessment at the bedside. He presents with a flat affect, does not make eye contact. He 
responds with one to two word responses, no spontaneous conversation. Denies S/I not right 
now. He had MOM this AM, but has not yet had a BM, so administered a second dose, as KUB 
showed moderate constipation. Encourage patient to drink water and walk the halls. He 
reluctantly got out of bed and walked the full length of the hallway and back. When asked if 
hed walk with this RN the length of the sanchez again, he shook his head no and went to bed. 
Compliant with medications, and denies AE's.



Plan: Crisis interruption, stabilization with medication adjustment, management and 
monitoring in a safe and therapeutic environment until stable. Patient has been referred to 
[facility].

## 2022-12-13 NOTE — NUR
Nursing Progress Note:



Problem: Pt. was admitted from [facility] Pt. is on a 5150 for DTS. Per 5150: Pt. made suicidal 
statements, and reported placing a knife to his neck and starting to stab himself in a 
suicide attempt. Pt. has a history of 3-5 past suicide attempts, the most recent was one 
month ago. Patient was discharged from [facility] on 11/29/22.  Per ER notes patient has 
Differential Diagnosis: Psychosis, Depression, Suicidal Ideation. 



Interventions: Provide medication administration & medication management; Maintained a safe 
& supportive environment; Clear & simple instructions; Direction & encouragement  regarding 
performance of ADLs; monitored behaviors & maintained clear boundaries; Patient physical 
assessment & 1:1 patient interview; Therapeutic conversation & active listening; Patient 
education & monitoring.



Response: Patient was laying in bed at shift change. Patient had blunt expression with 
minimal answers to questions. The patient reported being depressed and not wanting to harm 
himself at this time. The patient states that he drank 100% of his Ensure at dinner time. 
Patient self isolated to his room the entire shift. Patient refused snack and took evening 
meds w/o complications. Patient went to sleep directly after med pass.  



Plan: Crisis interruption, stabilization with medication adjustment, management and 
monitoring in a safe and therapeutic environment until stable. Patient has been referred to 
[facility].

## 2022-12-13 NOTE — NUR
Met with Mayco to check in with him. He reported he still wants to go to [facility], 
however, he does not feel like he is ready to interview. He reported he is still feeling 
depressed. He reported he had suicidal ideation  this morning with a plan to cut his wrist. 
Mood and affect were depressed. He made minimal eye contact. Hygiene was poor. Encouraged 
him to shower today. Encouraged him to attend groups and informed him he will need to attend 
groups while at [facility]. Asked him to attend the afternoon group today.



KALEY Lee

## 2022-12-13 NOTE — NUR
Reassessment: pt continues on Regular diet, has had avg 41% of last 3 

meals though mostly 0-25% prior to that. Avg intake 65% of ONS and will 

participate in snacks per documentation. Still only partially meeting 

needs. Per PA note, pt states that he always feels full and also declined 

a feeding tube; KUB 12/12 showed moderate constipation. Pt is receiving 

routine and PRN bowel care. Hopeful that PO may improve once constipation 

resolves. No change to recommendations at this time, will continue to 

monitor.

Recs:

1. Continue Regular diet; encourage PO

2. Ensure Enlive TID; substitute Ensure Plus High Protein TID while out of

Ensure Enlives

3. Routine bowel care

4. Weekly wts

-------------------------------------------------------------------------------

Addendum: 12/13/22 at 0730 by Ty Chaparro RD

-------------------------------------------------------------------------------

Amended: Links added.

## 2022-12-13 NOTE — NUR
Nursing Progress Note:



Problem: Pt. was admitted from [facility] Pt. is on a 5150 for DTS. Per 5150: Pt. made suicidal 
statements, and reported placing a knife to his neck and starting to stab himself in a 
suicide attempt. Pt. has a history of 3-5 past suicide attempts, the most recent was one 
month ago. Patient was discharged from [facility] on 11/29/22.  Per ER notes patient has 
Differential Diagnosis: Psychosis, Depression, Suicidal Ideation. 



Interventions: Provide medication administration & medication management; Maintained a safe 
& supportive environment; Clear & simple instructions; Direction & encouragement  regarding 
performance of ADLs; monitored behaviors & maintained clear boundaries; Patient physical 
assessment & 1:1 patient interview; Therapeutic conversation & active listening; Patient 
education & monitoring.



Response: Patient was woke up at approximately 0810 and ambulated to the Community Room for 
breakfast.  Patient reports during 1:1 Interview that he is feeling suicidal today, although 
he has not formulated a plan at this time.  Discussed patients possible discharge plan to 
[facility] when MD agrees the patient is ready for an interview and transfer.  Patient stated Im 
not ready to go there.  I really do want to go there but I know I am not ready to go there 
for at least 5 days or so.  Informed the patient that Moo or Francisca will discuss this with 
you before they schedule the interview.  Patient appears extremely fragile, stares straight 
ahead and rarely makes eye contact with this Writer, despite sitting across from him in the 
chair in his room or next to him in the Community Room.  Patients appetite is slowly 
improving and he is making a big effort to eat as well as drink the Ensure on each of his 
meal trays.  Invited the patient to the Group Meeting this afternoon but he did not attend.



Plan: Crisis interruption, stabilization with medication adjustment, management and 
monitoring in a safe and therapeutic environment until stable. Patient has been referred to 
[facility].

## 2022-12-14 NOTE — NUR
Nursing Progress Note:



Problem: Pt. was admitted from [facility] Pt. is on a 5150 for DTS. Per 5150: Pt. made suicidal 
statements, and reported placing a knife to his neck and starting to stab himself in a 
suicide attempt. Pt. has a history of 3-5 past suicide attempts, the most recent was one 
month ago. Patient was discharged from [facility] on 11/29/22.  Per ER notes patient has 
Differential Diagnosis: Psychosis, Depression, Suicidal Ideation. 



Interventions: Provide medication administration & medication management; Maintained a safe 
& supportive environment; Clear & simple instructions; Direction & encouragement  regarding 
performance of ADLs; monitored behaviors & maintained clear boundaries; Patient physical 
assessment & 1:1 patient interview; Therapeutic conversation & active listening; Patient 
education & monitoring.



Response:  Received patient asleep at change of shift.  Patient would not get up and eat his 
breakfast, nor his lunch.  Patient gives yes and no answers and affect is flat.  Patient 
stayed in bed, self-isolating all day.  Will monitor eating for dinner.





Plan: Crisis interruption, stabilization with medication adjustment, management and 
monitoring in a safe and therapeutic environment until stable. Patient has been referred to 
[facility].

## 2022-12-14 NOTE — NUR
Nursing Progress Note:



Problem: Pt. was admitted from [facility] Pt. is on a 5150 for DTS. Per 5150: Pt. made suicidal 
statements, and reported placing a knife to his neck and starting to stab himself in a 
suicide attempt. Pt. has a history of 3-5 past suicide attempts, the most recent was one 
month ago. Patient was discharged from [facility] on 11/29/22.  Per ER notes patient has 
Differential Diagnosis: Psychosis, Depression, Suicidal Ideation. 



Interventions: Provide medication administration & medication management; Maintained a safe 
& supportive environment; Clear & simple instructions; Direction & encouragement  regarding 
performance of ADLs; monitored behaviors & maintained clear boundaries; Patient physical 
assessment & 1:1 patient interview; Therapeutic conversation & active listening; Patient 
education & monitoring.



Response: Patient laying down in bed at shift change. Patient self isolated to room all 
evening and did not leave his room or bed. The pt reports not feeling SI at this time but 
does feel depressed and does not want to do anything but sleep. Patient reports drinking 
100% Ensure at dinner time. The patient made short minimal responses to questions and 
refused snack. Patient took all evening meds w/o complications. 



Plan: Crisis interruption, stabilization with medication adjustment, management and 
monitoring in a safe and therapeutic environment until stable. Patient has been referred to 
[facility].

## 2022-12-15 NOTE — NUR
Nursing Progress Note:



Problem: Pt. was admitted from [facility] Pt. is on a 5150 for DTS. Per 5150: Pt. made suicidal 
statements, and reported placing a knife to his neck and starting to stab himself in a 
suicide attempt. Pt. has a history of 3-5 past suicide attempts, the most recent was one 
month ago. Patient was discharged from [facility] on 11/29/22.  Per ER notes patient has 
Differential Diagnosis: Psychosis, Depression, Suicidal Ideation. 



Interventions: Provide medication administration & medication management; Maintained a safe 
& supportive environment; Clear & simple instructions; Direction & encouragement  regarding 
performance of ADLs; monitored behaviors & maintained clear boundaries; Patient physical 
assessment & 1:1 patient interview; Therapeutic conversation & active listening; Patient 
education & monitoring.



Response:  Received patient asleep at change of shift. Patient took morning medications. 
Stated he felt suicidal. Patient contracts for safety and will notify staff if he gets 
suicidal. He has not had a BM today. PRN MOM was administered. Patient slept until breakfast 
arrived. He went to the dining area and ate all of his meal and Ensure. Patient went back to 
room where he laid in bed until lunch. He took his scheduled medication and was prompted to 
eat lunch in the dining area. He ate 25 percent of meal and 100 percent of Ensure. When 
snack was offered patient stated he is not hungry and continued to lay in bed. Since given 
the MOM patient still has not had a BM. Patient has isolated to his room most of the day, 
except for meals.  Will encourage patient to go to community room and eat dinner tonight





Plan: Crisis interruption, stabilization with medication adjustment, management and 
monitoring in a safe and therapeutic environment until stable. Patient has been referred to 
[facility].

## 2022-12-16 NOTE — NUR
Nursing Progress Note:



Problem: Pt. was admitted from [facility] Pt. is on a 5150 for DTS. Per 5150: Pt. made suicidal 
statements, and reported placing a knife to his neck and starting to stab himself in a 
suicide attempt. Pt. has a history of 3-5 past suicide attempts, the most recent was one 
month ago. Patient was discharged from [facility] on 11/29/22.  Per ER notes patient has 
Differential Diagnosis: Psychosis, Depression, Suicidal Ideation. 



Interventions: Provide medication administration & medication management; Maintained a safe 
& supportive environment; Clear & simple instructions; Direction & encouragement  regarding 
performance of ADLs; monitored behaviors & maintained clear boundaries; Patient physical 
assessment & 1:1 patient interview; Therapeutic conversation & active listening; Patient 
education & monitoring.



Response:  Received patient asleep in bed and in no distress at change of shift. He woke and 
was cooperative with vitals and returned to sleep. Patient took morning medications, and ate 
most of breakfast. Pt is guarded and isolative, spending most of the day in bed, I like to 
be by myself. Pt denies SI/ HI, AV/Hs at this time. Pt reports not having a BM for five 
days, but EMR shows he had BM 2 days ago. Pt offered MOM but he did not want it. Pt observed 
while he shaved and was appreciative. Pt appears depressed with restricted affect. Overall 
pleasant and cooperative but guarded.



Plan: Crisis interruption, stabilization with medication adjustment, management and 
monitoring in a safe and therapeutic environment until stable. Patient has been referred to 
[facility].

## 2022-12-16 NOTE — NUR
Nursing Progress Note:



Problem: Pt. was admitted from [facility] Pt. is on a 5150 for DTS. Per 5150: Pt. made suicidal 
statements, and reported placing a knife to his neck and starting to stab himself in a 
suicide attempt. Pt. has a history of 3-5 past suicide attempts, the most recent was one 
month ago. Patient was discharged from [facility] on 11/29/22.  Per ER notes patient has 
Differential Diagnosis: Psychosis, Depression, Suicidal Ideation. 



Interventions: Provide medication administration & medication management; Maintained a safe 
& supportive environment; Clear & simple instructions; Direction & encouragement  regarding 
performance of ADLs; monitored behaviors & maintained clear boundaries; Patient physical 
assessment & 1:1 patient interview; Therapeutic conversation & active listening; Patient 
education & monitoring.



Response: Patient laying in bed at shift change. Patient reports being depressed, has no 
self interest. Patient exclaims that he doesn't care if his stomach hurts, has no wants or 
needs and just wants to sleep. Patient denies AV/H HI. The patient self isolated to room and 
did not get up.Encouraged patient to get out of room which had little effect.  The patient 
refused snack. Patient took all evening meds w/o complications and went to sleep directly 
after med pass. 



Plan: Crisis interruption, stabilization with medication adjustment, management and 
monitoring in a safe and therapeutic environment until stable. Patient has been referred to 
[facility].

## 2022-12-17 NOTE — NUR
Nursing Progress Note:



Problem: Pt. was admitted from [facility] Pt. is on a 5150 for DTS. Per 5150: Pt. made suicidal 
statements, and reported placing a knife to his neck and starting to stab himself in a 
suicide attempt. Pt. has a history of 3-5 past suicide attempts, the most recent was one 
month ago. Patient was discharged from [facility] on 11/29/22.  Per ER notes patient has 
Differential Diagnosis: Psychosis, Depression, and Suicidal Ideation. 



Interventions: Provide medication administration and medication management; Maintained a 
safe and supportive environment; Clear and simple instructions; Direction and encouragement  
regarding performance of ADLs; monitored behaviors and maintained clear boundaries; Patient 
physical assessment and 1:1 patient interview; Therapeutic conversation and active 
listening; Patient education and monitoring.



Response:  Pt resting in bed at start of shift. 1:1 assessment at bedside with minimal 
response. Pt denies SI/AH/VH. When asked how he was doing he states Im fine. Pt consumed 
100% of his Ensure. All HS meds administered. Pt did not have a snack and isolates himself 
to room. Pt states he had a BM today and was constipated. Pt slept through the night. 



Plan: Crisis interruption, stabilization with medication adjustment, management and 
monitoring in a safe and therapeutic environment until stable. Patient has been referred to 
[facility].

## 2022-12-17 NOTE — NUR
Reassessment: PO intake slightly fluctuates however overall PO intake has 

improved, documented with mostly % PO intake with refusal of two 

meals since 12/14. Pt continues with an Ensure TID of which pt mostly with 

100% PO intake of. IF pt continues with current trends in meal PO intake 

ONS will not be warranted. [facility] 12/16, receiving routine and PRN bowel 

care. No further nutrition intervention implemented at this time. Will 

continue to follow.

Recommendations:

1. Continue regular diet; encourage PO

2. Ensure Enlive TID; substitute Ensure Plus High Protein TID while out of

Ensure Enlive; discontinue/decrease ONS frequency if pt continues with adequate PO intake of 


meals

3. Routine bowel care; utilize PRN

4. Weekly scaled wts

-------------------------------------------------------------------------------

Addendum: 12/17/22 at 1029 by Yolande Driscoll RD

-------------------------------------------------------------------------------

Amended: Links added.

## 2022-12-17 NOTE — NUR
Nursing Progress Note:



Problem: Pt. was admitted from [facility] Pt. is on a 5150 for DTS. Per 5150: Pt. made suicidal 
statements, and reported placing a knife to his neck and starting to stab himself in a 
suicide attempt. Pt. has a history of 3-5 past suicide attempts, the most recent was one 
month ago. Patient was discharged from [facility] on 11/29/22.  Per ER notes patient has 
Differential Diagnosis: Psychosis, Depression, Suicidal Ideation. 



Interventions: Provide medication administration & medication management; Maintained a safe 
& supportive environment; Clear & simple instructions; Direction & encouragement  regarding 
performance of ADLs; monitored behaviors & maintained clear boundaries; Patient physical 
assessment & 1:1 patient interview; Therapeutic conversation & active listening; Patient 
education & monitoring.



Response:  Patient was asleep at change of shift.  RN awoke patient for meds and breakfast.  
Patient did get up and eat all his breakfast and then drank his protein shake.  Patient was 
also up for group today.  Patient states he is suicidal.  Patient does look very depressed 
with a flat affect. States he feels hopeless and helpless. Patient states he was hearing 
voices earlier but could not tell RN what they were saying.  





Plan: Crisis interruption, stabilization with medication adjustment, management and 
monitoring in a safe and therapeutic environment until stable. Patient has been referred to 
[facility].

## 2022-12-18 NOTE — NUR
Nursing Progress Note:



Problem: Pt. was admitted from [facility] Pt. is on a 5150 for DTS. Per 5150: Pt. made suicidal 
statements, and reported placing a knife to his neck and starting to stab himself in a 
suicide attempt. Pt. has a history of 3-5 past suicide attempts, the most recent was one 
month ago. Patient was discharged from [facility] on 11/29/22.  Per ER notes patient has 
Differential Diagnosis: Psychosis, Depression, Suicidal Ideation. 



Interventions: Provide medication administration & medication management; Maintained a safe 
& supportive environment; Clear & simple instructions; Direction & encouragement  regarding 
performance of ADLs; monitored behaviors & maintained clear boundaries; Patient physical 
assessment & 1:1 patient interview; Therapeutic conversation & active listening; Patient 
education & monitoring.



Response:  Patient was asleep at change of shift.  RN awoke patient for medication. Patient 
states he is feeling suicidal.  Patient states he is depressed.  Patient spends most of his 
time sleeping. States he feels hopeless and helpless. Patient denies hearing voices today. 





Plan: Crisis interruption, stabilization with medication adjustment, management and 
monitoring in a safe and therapeutic environment until stable. Patient has been referred to 
[facility].

## 2022-12-18 NOTE — NUR
Nursing Progress Note:



Problem: Pt. was admitted from [facility] Pt. is on a 5150 for DTS. Per 5150: Pt. made suicidal 
statements, and reported placing a knife to his neck and starting to stab himself in a 
suicide attempt. Pt. has a history of 3-5 past suicide attempts, the most recent was one 
month ago. Patient was discharged from [facility] on 11/29/22.  Per ER notes patient has 
Differential Diagnosis: Psychosis, Depression, and Suicidal Ideation. 



Interventions: Provide medication administration and medication management; Maintained a 
safe and supportive environment; Clear and simple instructions; Direction and encouragement  
regarding performance of ADLs; monitored behaviors and maintained clear boundaries; Patient 
physical assessment and 1:1 patient interview; Therapeutic conversation and active 
listening; Patient education and monitoring. Maintained Q 15min safety checks.



Response:  Pt resting in bed at start of shift. Pt sat up on bed for 1:1 assessment at 
bedside. Pt denies SI/AH/VH. When asked how he was doing he states feeling okay. Pt states 
he is feeling depressed but does not know why. Pt consumed 100% of his Ensure. All HS meds 
administered. Pt had a snack and went back in his room to rest. Pt states he did not have a 
BM today. Pt slept through the night. 



Plan: Crisis interruption, stabilization with medication adjustment, management and 
monitoring in a safe and therapeutic environment until stable. Patient has been referred to 
[facility].

## 2022-12-18 NOTE — NUR
Nursing Progress Note:



Problem: Pt. was admitted from [facility] Pt. is on a 5150 for DTS. Per 5150: Pt. made suicidal 
statements, and reported placing a knife to his neck and starting to stab himself in a 
suicide attempt. Pt. has a history of 3-5 past suicide attempts, the most recent was one 
month ago. Patient was discharged from [facility] on 11/29/22.  Per ER notes patient has 
Differential Diagnosis: Psychosis, Depression, Suicidal Ideation. 



Interventions: Provide medication administration & medication management; Maintained a safe 
& supportive environment; Clear & simple instructions; Direction & encouragement  regarding 
performance of ADLs; monitored behaviors & maintained clear boundaries; Patient physical 
assessment & 1:1 patient interview; Therapeutic conversation & active listening; Patient 
education & monitoring.



Response:  Pt was sitting quietly in his room at change of shift. Pt gives minimal response 
to questions and reports no needs. Pt states his day was "good", denies s/i, but is guarded. 
Pt went to the group room for snacks and was smiling and pleasant. Pt returned to his room 
after snacks. Pt took HS meds and went to bed.  





Plan: Crisis interruption, stabilization with medication adjustment, management and 
monitoring in a safe and therapeutic environment until stable. Patient has been referred to 
[facility].

## 2022-12-19 NOTE — NUR
Nursing Progress Note:



Problem: Pt. was admitted from [facility] Pt. is on a 5150 for DTS. Per 5150: Pt. made suicidal 
statements, and reported placing a knife to his neck and starting to stab himself in a 
suicide attempt. Pt. has a history of 3-5 past suicide attempts, the most recent was one 
month ago. Patient was discharged from [facility] on 11/29/22.  Per ER notes patient has 
Differential Diagnosis: Psychosis, Depression, Suicidal Ideation. 



Interventions: Provide medication administration & medication management; Maintained a safe 
& supportive environment; Clear & simple instructions; Direction & encouragement  regarding 
performance of ADLs; monitored behaviors & maintained clear boundaries; Patient physical 
assessment & 1:1 patient interview; Therapeutic conversation & active listening; Patient 
education & monitoring.



Response:  Patient was asleep at change of shift and up for breakfast. Patient states he 
denies feeling suicidal.  Patient states he is depression is better.  Patient still spends 
most of his time sleeping.  Patient denies hearing voices today. 





Plan: Crisis interruption, stabilization with medication adjustment, management and 
monitoring in a safe and therapeutic environment until stable. Patient has been referred to 
[facility].

## 2022-12-19 NOTE — NUR
Nursing Progress Note:



Problem: Pt. was admitted from [facility] Pt. is on a 5150 for DTS. Per 5150: Pt. made suicidal 
statements, and reported placing a knife to his neck and starting to stab himself in a 
suicide attempt. Pt. has a history of 3-5 past suicide attempts, the most recent was one 
month ago. Patient was discharged from [facility] on 11/29/22.  Per ER notes patient has 
Differential Diagnosis: Psychosis, Depression, Suicidal Ideation. 



Interventions: Provide medication administration & medication management; Maintained a safe 
& supportive environment; Clear & simple instructions; Direction & encouragement  regarding 
performance of ADLs; monitored behaviors & maintained clear boundaries; Patient physical 
assessment & 1:1 patient interview; Therapeutic conversation & active listening; Patient 
education & monitoring.



Response:  Pt was sitting quietly in bed at change of shift. Pt is guarded during 1:1 
assessment and denies s/i, denies depression stating he is hoping to get accepted at the 
[facility]. Pt states he is ready to go and feels like his is doing better. Pt reports 
constipation and initially refused any prns but did decide to take prn Milk of Mag with HS 
meds. Pt was encouraged to come out of his room for snacks but remained in his bed. Pt went 
to sleep after taking HS meds. 





Plan: Crisis interruption, stabilization with medication adjustment, management and 
monitoring in a safe and therapeutic environment until stable. Patient has been referred to 
[facility].

## 2022-12-20 NOTE — NUR
Therapeutic group

DESCRIPTION Daily therapeutic groups support [facility] crisis-recovery environment.

Topic:  psychosocial educationgestures of gratitude and thanks release dopamine, which 
allows a feeling of well-being, pleasure, accomplishment. Activity: requested by clients -- 
thank you banner for kitchen staff. Discussion of supportive but not problem solving peer 
conversation (V-validation, A-acknowledgement, R-refer).

INTERVENTION Therapeutic communication, peer support, validation by peers, coping skills and 
psychosocial education.  Secondarily: intellectual and physical activity, peer, and staff 
companionship, alleviating and discouraging isolation. 

RESPONSE Client spoke few words, but made a beautiful drawing of an owl to put in the 
gratitude card made by the group. Client stayed for the entirety of group, which is a first 
for this writer who is grateful for his contribution and progress.

TREATMENT Until stable, client requires time in a safe and therapeutic environment employing 
multidisciplinary treatments, including therapeutic groups.

## 2022-12-20 NOTE — NUR
Nursing Progress Note: Mayco CAI



Problem: 

Pt. was admitted from [facility] Pt. is on a 5150 for DTS. Per 5150: Pt. made suicidal statements, 
and reported placing a knife to his neck and starting to stab himself in a suicide attempt. 
Pt. has a history of 3-5 past suicide attempts, the most recent was one month ago. Patient 
was discharged from [facility] on 11/29/22.  Dx of psychosis, depression, suicidal ideation. 
Current on voluntary



Interventions: 

Medication administration, 1:1 MH assessment, maintained a safe and supportive environment,  
provided clear and simple instructions, provided encouragement regarding performance of 
ADLs, monitored behaviors and maintained clear boundaries, maintained Q15 minute safety 
checks.

Response: 

Received pt. sleeping in his room, he awoke for medications which he took without 
hesitation. Pt. denies SI, Hi, AH, VH and reports feeling good He did eat his meals in the 
main dining area with cohorts, and keeps to himself. Pt. was open to taking a shower when 
offered and requires prompting to attend snack. Pt. isolates in his room, has a timid voice, 
presents as fatigued sleeping often. Pt. has fair hygiene today, short hair, and wearing 
unit scrubs.  



Plan: 

Crisis interruption, stabilization with medication adjustment, management and monitoring in 
a safe and therapeutic environment until stable. Patient has been referred to [facility].

## 2022-12-20 NOTE — NUR
Nursing Progress Note: Mayco CAI



Problem: 

Pt. was admitted from [facility] Pt. is on a 5150 for DTS. Per 5150: Pt. made suicidal statements, 
and reported placing a knife to his neck and starting to stab himself in a suicide attempt. 
Pt. has a history of 3-5 past suicide attempts, the most recent was one month ago. Patient 
was discharged from [facility] on 11/29/22.  Dx of psychosis, depression, suicidal ideation. 
Current on voluntary



Interventions: 

Medication administration, 1:1 MH assessment, maintained a safe and supportive environment,  
provided clear and simple instructions, provided encouragement regarding performance of 
ADLs, monitored behaviors and maintained clear boundaries, maintained Q15 minute safety 
checks.



Response: 

Pt was in his room at change of shift napping. Pt is guarded, gives minimal response to 
questions. Pt reports his depression is 2/10 and denies s/i, denies a/vh. Pt took HS meds, 
reports he had a large bm today. Pt declined a snack and isolates to his room this shift. 



Plan: 

Crisis interruption, stabilization with medication adjustment, management and monitoring in 
a safe and therapeutic environment until stable. Patient has been referred to [facility].

## 2022-12-20 NOTE — NUR
[facility]



There currently are not any beds at [facility]. They expect they will have a bed open on 1/8/23. 
[facility] still needs to interview David. Requested an interview.



KALEY Lee

## 2022-12-21 NOTE — NUR
ACCEPTED AT [facility]



David was interviewed by [facility] staff today and has been accepted. It is unknown when a bed 
will be available for him.



KALEY Lee

## 2022-12-21 NOTE — NUR
Nursing Progress Note: Mayco CAI



Problem: 

Pt. was admitted from [facility] Pt. is on a 5150 for DTS. Per 5150: Pt. made suicidal statements, 
and reported placing a knife to his neck and starting to stab himself in a suicide attempt. 
Pt. has a history of 3-5 past suicide attempts, the most recent was one month ago. Patient 
was discharged from [facility] on 11/29/22.  Dx of psychosis, depression, suicidal ideation. 
Current on voluntary



Interventions: 

Medication administration, 1:1 MH assessment, maintained a safe and supportive environment,  
provided clear and simple instructions, provided encouragement regarding performance of 
ADLs, monitored behaviors and maintained clear boundaries, maintained Q15 minute safety 
checks.

Response: 

Received pt. sleeping in his room, he awoke for medications which he took without 
hesitation. Pt. endorses SI stating yes, and if I leave here Ill go to my storage unit 
where I have knives, and do it Pt. reported feeling depressed he denies HI, AV, HV and 
reports his DC plan is to go to [facility] Pt. isolated in his room throughout the shift, and 
presents as downcasted. He did attend all meals in the dining room but often sits alone in a 
chair with his quinton in his lap. Pt. was encouraged to take a shower and was willing to do 
so. Pt. presents with fair hygiene, un combed hair, and wears unit scrubs. 



Plan: 

Crisis interruption, stabilization with medication adjustment, management and monitoring in 
a safe and therapeutic environment until stable. Patient has been referred to [facility].

## 2022-12-22 NOTE — NUR
Nursing Progress Note: Mayco CAI



Problem: 

Pt. was admitted from [facility] Pt. is on a 5150 for DTS. Per 5150: Pt. made suicidal statements, 
and reported placing a knife to his neck and starting to stab himself in a suicide attempt. 
Pt. has a history of 3-5 past suicide attempts, the most recent was one month ago. Patient 
was discharged from [facility] on 11/29/22.  Dx of psychosis, depression, suicidal ideation. 
Current on voluntary



Interventions: 

Medication administration, 1:1 MH assessment, maintained a safe and supportive environment,  
provided clear and simple instructions, provided encouragement regarding performance of 
ADLs, monitored behaviors and maintained clear boundaries, maintained Q15 minute safety 
checks.

Response: 

Received pt. asleep, he awoke to take his medications and sat without saying a word. Pt. 
requires prompting to talk and answers briefly. He continues to endorse SI stating I think 
about doing it but would not elaborate today. Pt. denies HI, AH, VH and reports he is going 
to the [facility]. Pt. isolated in his room throughout the shift. He did attend meals unprompted 
and ate at least 50% of all meals offered. Pt. has poor intake of free water and educated on 
hydration. Pt. took a shower today, hair is combed, and is wearing street clothes. 



Plan: 

Crisis interruption, stabilization with medication adjustment, management and monitoring in 
a safe and therapeutic environment until stable. Patient has been referred to [facility] [place].

## 2022-12-22 NOTE — NUR
Reassessment; Pt continues on Regular diet w/ avg intake 70% of meals and 

42% of ONS, meeting est needs at this time. LBM 12/20 receiving routine colace. No new 
nutrition intervention implemented at this time, will continue to monitor.

Recommendations:

1. Continue regular diet; encourage PO

2. Ensure Enlive TID; substitute Ensure Plus High Protein TID while out of

Ensure Enlive; discontinue/decrease ONS frequency if pt continues with

adequate PO intake of meals

3. Routine bowel care; utilize PRN bowel care

4. Weekly scaled wts

-------------------------------------------------------------------------------

Addendum: 12/22/22 at 0717 by Ty Chaparro RD

-------------------------------------------------------------------------------

Amended: Links added.

## 2022-12-22 NOTE — NUR
Nursing Progress Note: Mayco CAI



Problem: 

Pt. was admitted from [facility] Pt. is on a 5150 for DTS. Per 5150: Pt. made suicidal statements, 
and reported placing a knife to his neck and starting to stab himself in a suicide attempt. 
Pt. has a history of 3-5 past suicide attempts, the most recent was one month ago. Patient 
was discharged from [facility] on 11/29/22.  Dx of psychosis, depression, suicidal ideation. 
Current on voluntary



Interventions: 

Medication administration, 1:1 MH assessment, maintained a safe and supportive environment,  
provided clear and simple instructions, provided encouragement regarding performance of 
ADLs, monitored behaviors and maintained clear boundaries, maintained Q15 minute safety 
checks.





Response: Patient was fond in bed sleeping at change of shift. Patient continued to sleep 
and self isolate in room through out shift. Patient took all night medications without issue 
and retuned to laying down. Patient was encouraged to get up and participate in snack but 
refused. 



Plan: 

Crisis interruption, stabilization with medication adjustment, management and monitoring in 
a safe and therapeutic environment until stable. Patient has been referred to [facility].

## 2022-12-23 NOTE — NUR
Nursing Progress Note: 



Problem:  Pt. was admitted from [facility] Pt. is on a 5150 for DTS. Per 5150: Pt. made suicidal 
statements, and reported placing a knife to his neck and starting to stab himself in a 
suicide attempt. Pt. has a history of 3-5 past suicide attempts, the most recent was one 
month ago. Patient was discharged from [facility] on 11/29/22.  Dx of psychosis, depression, 
suicidal ideation. Current on voluntary



Interventions:  Medication administration, 1:1 MH assessment, maintained a safe and 
supportive environment,  provided clear and simple instructions, provided encouragement 
regarding performance of ADLs, monitored behaviors and maintained clear boundaries, 
maintained Q15 minute safety checks.



Response:  Patient sleeping at shift change, but was up for breakfast. He comes and goes 
without saying a word to anyone. He accepts morning meds without comment. Patient nods his 
head when asked about SI, and will not talk about it. He shakes his head when asked about 
other MH symptoms. Patient is rarely seen between meals outside his room. He will discharge 
to the [facility] soon after Fay.



Plan:  Crisis interruption, stabilization with medication adjustment, management and 
monitoring in a safe and therapeutic environment until stable. Patient has been referred to 
[facility].

## 2022-12-23 NOTE — NUR
Nursing Progress Note



Problem: Pt. was admitted from [facility] Pt. is on a 5150 for DTS. Per 5150: Pt. made suicidal 
statements, and reported placing a knife to his neck and starting to stab himself in a 
suicide attempt. Pt. has a history of 3-5 past suicide attempts, the most recent was one 
month ago. Patient was discharged from [facility] on 11/29/22.  Dx of psychosis, depression, 
suicidal ideation. Current on voluntary



Interventions: Medication administration, 1:1 MH assessment, maintained a safe and 
supportive environment,  provided clear and simple instructions, provided encouragement 
regarding performance of ADLs, monitored behaviors and maintained clear boundaries, 
maintained Q15 minute safety checks.



Response: Received patient in room asleep. Self-isolating in bedroom. Reports during 1:1 SI 
right now, -HI, -AV/H, 4/10 depression and no anxiety. Pt with flat affect, avoiding eye 
contact. HR RRR, LSCTA, BS + 4 quads, PERRLA, - Homans, reports LBM 12/22/2022. 
Self-isolating in bedroom with lights off. Joined snack in community room. All medications 
taken, reports no ASE. Observed to be sleeping throughout shift.



Plan: Crisis interruption, stabilization with medication adjustment, management and 
monitoring in a safe and therapeutic environment until stable. Patient has been accepted to 
[facility].

## 2022-12-24 NOTE — NUR
Nursing Progress Note: 



Problem:  Pt. was admitted from [facility] Pt. is on a 5150 for DTS. Per 5150: Pt. made suicidal 
statements, and reported placing a knife to his neck and starting to stab himself in a 
suicide attempt. Pt. has a history of 3-5 past suicide attempts, the most recent was one 
month ago. Patient was discharged from [facility] on 11/29/22.  Dx of psychosis, depression, 
suicidal ideation. Current on voluntary



Interventions:  Medication administration, 1:1 MH assessment, maintained a safe and 
supportive environment,  provided clear and simple instructions, provided encouragement 
regarding performance of ADLs, monitored behaviors and maintained clear boundaries, 
maintained Q15 minute safety checks.



Response:  Patient was observed sleeping at beginning of shift. Patient continued to sleep 
through out shift. Patient woke up to take night medications and retuned to bed. Patient 
refused to get up for snack. Nurse asked how patient felt about leaving in a couple of days. 
Patient spoke very low and stated he didn't want to leave.  



Plan:  Crisis interruption, stabilization with medication adjustment, management and 
monitoring in a safe and therapeutic environment until stable. Patient has been referred to 
[facility].

## 2022-12-24 NOTE — NUR
Nursing Progress Note: 



Problem:  Pt. was admitted from [facility] Pt. is on a 5150 for DTS. Per 5150: Pt. made suicidal 
statements, and reported placing a knife to his neck and starting to stab himself in a 
suicide attempt. Pt. has a history of 3-5 past suicide attempts, the most recent was one 
month ago. Patient was discharged from [facility] on 11/29/22.  Dx of psychosis, depression, 
suicidal ideation. Current on voluntary



Interventions:  Medication administration, 1:1 MH assessment, maintained a safe and 
supportive environment,  provided clear and simple instructions, provided encouragement 
regarding performance of ADLs, monitored behaviors and maintained clear boundaries, 
maintained Q15 minute safety checks.

Response:  Patient gets up for breakfast on his own. He is eating better and spending more 
time out of his room. Patient continues to be depressed and speaks very little. Answers to 
questions continue to be very low volume if at all. He continues to be compliant with 
medications while he waits to go to [facility]



Plan:  Crisis interruption, stabilization with medication adjustment, management and 
monitoring in a safe and therapeutic environment until stable. Patient has been referred to 
[facility].

## 2022-12-25 NOTE — NUR
Nursing Progress Note:    



Problem: 

Pt. was admitted from [facility] Pt. is on a 5150 for DTS. Per 5150: Pt. made suicidal statements, 
and reported placing a knife to his neck and starting to stab himself in a suicide attempt. 
Pt. has a history of 3-5 past suicide attempts, the most recent was one month ago. Patient 
was discharged from [facility] on 11/29/22.  Dx of psychosis, depression, suicidal ideation. 
Current on voluntary



Interventions: 

Medication administration, 1:1 MH assessment, maintained a safe and supportive environment,  
provided clear and simple instructions, provided encouragement regarding performance of 
ADLs, monitored behaviors and maintained clear boundaries, maintained Q15 minute safety 
checks.

Response: 

Received Pt in bed sleeping w/o distress at the beginning of this shift. Pt woke and was 
cooperative with vitals; ate meals well and took medications throughout the day w/o issue. 
Pt is cooperative but guarded and answers with short responses. Pt walked halls a bit and 
isolated in room and from others. Pt showered in AM and endorses passive SI but denies HI, 
AV/Hs. Pt states he is waiting for a bed to open at the [facility].



Plan: 

Crisis interruption, stabilization with medication adjustment, management and monitoring in 
a safe and therapeutic environment until stable. Patient has been referred to [facility].

## 2022-12-25 NOTE — NUR
Nursing Progress Note: 



Problem:  Pt. was admitted from [facility] Pt. is on a 5150 for DTS. Per 5150: Pt. made suicidal 
statements, and reported placing a knife to his neck and starting to stab himself in a 
suicide attempt. Pt. has a history of 3-5 past suicide attempts, the most recent was one 
month ago. Patient was discharged from [facility] on 11/29/22.  Dx of psychosis, depression, 
suicidal ideation. Current on voluntary



Interventions:  Patient was observed sleeping at beginning of shift. Patient continued to 
sleep until right before snack time and began pacing around unit. Patient was very 
anti-social and avoiding eye contact while walking around unit. Patient participated in 
snack time and then returned back to his room. Patient took all night medications without 
issue. 





Plan:  Crisis interruption, stabilization with medication adjustment, management and 
monitoring in a safe and therapeutic environment until stable. [facility]

## 2022-12-26 NOTE — NUR
Nursing Progress Note:  David  



Problem: 

Pt. was admitted from [facility] Pt. is on a 5150 for DTS. Per 5150: Pt. made suicidal statements, 
and reported placing a knife to his neck and starting to stab himself in a suicide attempt. 
Pt. has a history of 3-5 past suicide attempts, the most recent was one month ago. Patient 
was discharged from [facility] on 11/29/22.  Dx of psychosis, depression, suicidal ideation. 
Current on voluntary



Interventions: 

Medication administration, 1:1 MH assessment, maintained a safe and supportive environment,  
provided clear and simple instructions, provided encouragement regarding performance of 
ADLs, monitored behaviors and maintained clear boundaries, maintained Q15 minute safety 
checks.

Response: 

Received Pt resting in bed, pt cooperative with care.  Pt has flat affect, states he has 
some depression and always feels suicidal but does not have a plan.  Pt came out of room for 
snacks and took all HS medications without issue.  Pt went to bed shortly after med pass. 



Plan: 

Crisis interruption, stabilization with medication adjustment, management and monitoring in 
a safe and therapeutic environment until stable. Patient has been referred to [facility].

## 2022-12-26 NOTE — NUR
Nursing Progress Note: 



Problem:  Pt. was admitted from [facility] Pt. is on a 5150 for DTS. Per 5150: Pt. made suicidal 
statements, and reported placing a knife to his neck and starting to stab himself in a 
suicide attempt. Pt. has a history of 3-5 past suicide attempts, the most recent was one 
month ago. Patient was discharged from [facility] on 11/29/22.  Dx of psychosis, depression, 
suicidal ideation. Current on voluntary



Interventions:  Medication administration, 1:1 MH assessment, maintained a safe and 
supportive environment,  provided clear and simple instructions, provided encouragement 
regarding performance of ADLs, monitored behaviors and maintained clear boundaries, 
maintained Q15 minute safety checks.



Response:  Patient woke up for breakfast, and came to community room for his meal and AM 
meds. Patient does not speak to his peers or join conversations. His eye contact is poor. 
Patient goes back to bed after breakfast, and sleeps until lunch time. He rates his 
depression at 4/10 today. SI is present with no plan. Poverty of speech is evident. Patient 
will discharge to the [facility] later this week.



Plan:  Crisis interruption, stabilization with medication adjustment, management and 
monitoring in a safe and therapeutic environment until stable. Patient has been referred to 
[facility].

## 2022-12-27 NOTE — NUR
Nursing Progress Note: Mayco



Problem:  Pt. was admitted from [facility] Pt. is on a 5150 for DTS. Per 5150: Pt. made suicidal 
statements, and reported placing a knife to his neck and starting to stab himself in a 
suicide attempt. Pt. has a history of 3-5 past suicide attempts, the most recent was one 
month ago. Patient was discharged from [facility] on 11/29/22.  Dx of psychosis, depression, 
suicidal ideation. Current on voluntary



Interventions:  Medication administration, 1:1 MH assessment, maintained a safe and 
supportive environment,  provided clear and simple instructions, provided encouragement 
regarding performance of ADLs, monitored behaviors and maintained clear boundaries, 
maintained Q15 minute safety checks.



Response:  Patient lying in bed resting, cooperative with care.  States his depression is 
5/10, has SI but no plan.  Pt has flat effect with poor eye contact.  Pt takes all HS 
medications without issue and goes back to sleep.  



Plan:  Crisis interruption, stabilization with medication adjustment, management and 
monitoring in a safe and therapeutic environment until stable. Patient has been referred to 
[facility].

## 2022-12-27 NOTE — NUR
Reassessment: Per EMR PO intake has significantly improved, documented 

with mostly 100% PO intake of meals. Pt still receiving an Ensure TID 

documented with % PO intake of ONS. TC to [facility], left message for RN 

with recommendation to discontinue ONS as pt now meeting estimated 

nutrient needs with PO intake of meals alone. [facility] 12/27. Will continue to 

follow.

Recommendations:

1. Continue regular diet; encourage PO

2. Discontinue Ensure as pt meeting estimated nutrient needs with PO 

intake of meals

3. Routine bowel care; utilize PRN bowel care

4. Weekly scaled wts

-------------------------------------------------------------------------------

Addendum: 12/27/22 at 1421 by Yolande Driscoll RD

-------------------------------------------------------------------------------

Amended: Links added.

## 2022-12-27 NOTE — NUR
Therapeutic group

Description: Daily therapeutic groups support [facility] crisis-recovery environment.

Topic:  reading empowering poems, creating word collages/poems about empowerment. Group 
discussion veered into peer discussion of the dangers of using meth when you have a mental 
illness. One new peer was disruptive, and this writer will conduct a structured, directive 
activities-based group tomorrow. 

Intervention: Therapeutic communication, peer support, validation by peers, coping skills 
and psychosocial education.  Secondarily, intellectual and physical activity, peer, and 
staff companionship, alleviating and discouraging isolation. 

Response: Client listened quietly, briefly answered direct questions.

Treatment Until stable, client requires time in a safe and therapeutic environment employing 
multidisciplinary treatments, including therapeutic groups.

## 2022-12-27 NOTE — NUR
Nursing Progress Note: 



Problem:  Pt. was admitted from [facility] Pt. is on a 5150 for DTS. Per 5150: Pt. made suicidal 
statements, and reported placing a knife to his neck and starting to stab himself in a 
suicide attempt. Pt. has a history of 3-5 past suicide attempts, the most recent was one 
month ago. Patient was discharged from [facility] on 11/29/22.  Dx of psychosis, depression, 
suicidal ideation. Current on voluntary



Interventions:  Medication administration, 1:1 MH assessment, maintained a safe and 
supportive environment,  provided clear and simple instructions, provided encouragement 
regarding performance of ADLs, monitored behaviors and maintained clear boundaries, 
maintained Q15 minute safety checks.



Response:  Pt was in the group room at change of shift. Pt gives short answers and reports 
no needs. States he thinks going to the [facility] is going to help him but doesnt say how. Pt was 
assisted with shaving tonight. Pt isolates to himself and returned to his bedroom shortly 
before snack. Pt denies s/i, states he is feeling better. Pt took HS meds and went to bed.



Plan:  Crisis interruption, stabilization with medication adjustment, management and 
monitoring in a safe and therapeutic environment until stable. Patient has been referred to 
[facility].

## 2022-12-27 NOTE — NUR
Nursing Progress Note: 



Problem:  Pt. was admitted from [facility] Pt. is on a 5150 for DTS. Per 5150: Pt. made suicidal 
statements, and reported placing a knife to his neck and starting to stab himself in a 
suicide attempt. Pt. has a history of 3-5 past suicide attempts, the most recent was one 
month ago. Patient was discharged from [facility] on 11/29/22.  Dx of psychosis, depression, 
suicidal ideation. Current on voluntary



Interventions:  Medication administration, 1:1 MH assessment, maintained a safe and 
supportive environment,  provided clear and simple instructions, provided encouragement 
regarding performance of ADLs, monitored behaviors and maintained clear boundaries, 
maintained Q15 minute safety checks.



Response:  Received patient sleeping in bed at change of shift.  Patient is medication 
compliant. Patient came out of his room after lunch and sat in group room.  Patient reports 
that he is depressed and thinking about SI, with no plan to act on it. Patient only answers 
closed ended questions, and states he has no concerns at this time. 



Plan:  Crisis interruption, stabilization with medication adjustment, management and 
monitoring in a safe and therapeutic environment until stable. Patient has been referred to 
[facility].

## 2022-12-28 NOTE — NUR
DISCHARGE NOTE:



Patient is ready to discharge to [facility] today. Patient denies SI.  Mood is good.  Patient's 
belongings were given to patient at discharge.